# Patient Record
Sex: FEMALE | URBAN - METROPOLITAN AREA
[De-identification: names, ages, dates, MRNs, and addresses within clinical notes are randomized per-mention and may not be internally consistent; named-entity substitution may affect disease eponyms.]

---

## 2019-07-26 ENCOUNTER — APPOINTMENT (OUTPATIENT)
Age: 64
Setting detail: DERMATOLOGY
End: 2019-08-01

## 2019-07-26 PROBLEM — C44.91 BASAL CELL CARCINOMA OF SKIN, UNSPECIFIED: Status: ACTIVE | Noted: 2019-07-26

## 2019-07-26 PROCEDURE — OTHER MOHS SURGERY PHONE CONSULTATION: OTHER

## 2019-07-26 NOTE — PROCEDURE: MOHS SURGERY PHONE CONSULTATION
Date Of Mohs Surgery: 07/30/2019
Date Of Surgical Consultation If Needed: 07/26/2019
Has The Patient Ever Received A Transplant?: No
Detail Level: Detailed
Office Location Of Mohs Surgery: [facility]
Patient Reported Location: L nostral
Has The Pathology Report Been Received?: Yes
Referring Provider: Anyi Teague MD
Which Antibiotic Do They Take For Surgical Prophylaxis?: Amoxicillin (2 grams)

## 2019-07-30 ENCOUNTER — APPOINTMENT (OUTPATIENT)
Age: 64
Setting detail: DERMATOLOGY
End: 2019-08-01

## 2019-07-30 DIAGNOSIS — Z71.89 OTHER SPECIFIED COUNSELING: ICD-10-CM

## 2019-07-30 PROBLEM — C44.311 BASAL CELL CARCINOMA OF SKIN OF NOSE: Status: ACTIVE | Noted: 2019-07-30

## 2019-07-30 PROCEDURE — OTHER COUNSELING: OTHER

## 2019-07-30 PROCEDURE — OTHER MOHS SURGERY: OTHER

## 2019-07-30 PROCEDURE — 17311 MOHS 1 STAGE H/N/HF/G: CPT

## 2019-07-30 PROCEDURE — OTHER MIPS QUALITY: OTHER

## 2019-07-30 NOTE — PROCEDURE: MIPS QUALITY
Detail Level: Detailed
Quality 474: Zoster Vaccination Status: Shingrix Vaccination not Administered or Previously Received, Reason not Otherwise Specified
Quality 110: Preventive Care And Screening: Influenza Immunization: Influenza Immunization previously received during influenza season
Quality 431: Preventive Care And Screening: Unhealthy Alcohol Use - Screening: Patient screened for unhealthy alcohol use using a single question and scores 2 or greater episodes per year and brief intervention occurred
Quality 130: Documentation Of Current Medications In The Medical Record: Current Medications Documented
Quality 143: Oncology: Medical And Radiation- Pain Intensity Quantified: Pain severity quantified, no pain present
Quality 226: Preventive Care And Screening: Tobacco Use: Screening And Cessation Intervention: Patient screened for tobacco and is an ex-smoker

## 2019-07-30 NOTE — PROCEDURE: MOHS SURGERY
O-L Flap Text: In order to preserve normal anatomic and functional relationships, an O-L advancement flap was deemed the most appropriate reconstructive procedure.  The beveled edges of the Mohs defect were excised with a #15 scalpel blade.    The flap was designed to fall along relaxed skin tension lines and/or free margins, incised, and elevated using blunt curved tissue scissors.  Hemostasis was achieved.  Interrupted buried vertical mattress sutures were employed to secure the flap in place.  Redundant cutaneous columns defined by the flap's movement were removed to the adipose layer using the triangulation technique and repaired in similar fashion.  Final epidermal approximation along the length of the flap was achieved using epidermal sutures.  The wound was stressed in various directions to ensure the adequacy of the closure and of hemostasis.  The flap edges appeared pink and well perfused.  Reconstruction was considered complete.

## 2022-05-26 NOTE — PROCEDURE: MOHS SURGERY
No difficulties Double O-Z Plasty Text: The defect edges were debeveled with a #15 scalpel blade.  Given the location of the defect, shape of the defect and the proximity to free margins a Double O-Z plasty (double transposition flap) was deemed most appropriate.  Using a sterile surgical marker, the appropriate transposition flaps were drawn incorporating the defect and placing the expected incisions within the relaxed skin tension lines where possible. The area thus outlined was incised deep to adipose tissue with a #15 scalpel blade.  The skin margins were undermined to an appropriate distance in all directions utilizing iris scissors.  Hemostasis was achieved with electrocautery.  The flaps were then transposed into place, one clockwise and the other counterclockwise, and anchored with interrupted buried subcutaneous sutures.
